# Patient Record
Sex: MALE | Race: WHITE | Employment: OTHER | URBAN - METROPOLITAN AREA
[De-identification: names, ages, dates, MRNs, and addresses within clinical notes are randomized per-mention and may not be internally consistent; named-entity substitution may affect disease eponyms.]

---

## 2021-09-01 ENCOUNTER — APPOINTMENT (OUTPATIENT)
Dept: GENERAL RADIOLOGY | Facility: CLINIC | Age: 81
End: 2021-09-01
Attending: EMERGENCY MEDICINE
Payer: MEDICARE

## 2021-09-01 ENCOUNTER — HOSPITAL ENCOUNTER (EMERGENCY)
Facility: CLINIC | Age: 81
Discharge: HOME | End: 2021-09-01
Attending: EMERGENCY MEDICINE | Admitting: EMERGENCY MEDICINE
Payer: MEDICARE

## 2021-09-01 VITALS
RESPIRATION RATE: 18 BRPM | SYSTOLIC BLOOD PRESSURE: 100 MMHG | OXYGEN SATURATION: 99 % | WEIGHT: 180 LBS | DIASTOLIC BLOOD PRESSURE: 62 MMHG | BODY MASS INDEX: 25.77 KG/M2 | HEART RATE: 69 BPM | HEIGHT: 70 IN | TEMPERATURE: 97.5 F

## 2021-09-01 DIAGNOSIS — N18.9 CHRONIC KIDNEY DISEASE, UNSPECIFIED CKD STAGE: ICD-10-CM

## 2021-09-01 DIAGNOSIS — J44.1 COPD EXACERBATION (H): ICD-10-CM

## 2021-09-01 LAB
ALBUMIN SERPL-MCNC: 3.5 G/DL (ref 3.4–5)
ALP SERPL-CCNC: 81 U/L (ref 40–150)
ALT SERPL W P-5'-P-CCNC: 23 U/L (ref 0–70)
ANION GAP SERPL CALCULATED.3IONS-SCNC: 3 MMOL/L (ref 3–14)
AST SERPL W P-5'-P-CCNC: 13 U/L (ref 0–45)
BASOPHILS # BLD AUTO: 0 10E3/UL (ref 0–0.2)
BASOPHILS NFR BLD AUTO: 1 %
BILIRUB SERPL-MCNC: 0.3 MG/DL (ref 0.2–1.3)
BUN SERPL-MCNC: 28 MG/DL (ref 7–30)
CALCIUM SERPL-MCNC: 8.5 MG/DL (ref 8.5–10.1)
CHLORIDE BLD-SCNC: 107 MMOL/L (ref 94–109)
CO2 SERPL-SCNC: 31 MMOL/L (ref 20–32)
CREAT SERPL-MCNC: 2.25 MG/DL (ref 0.66–1.25)
EOSINOPHIL # BLD AUTO: 0 10E3/UL (ref 0–0.7)
EOSINOPHIL NFR BLD AUTO: 0 %
ERYTHROCYTE [DISTWIDTH] IN BLOOD BY AUTOMATED COUNT: 13 % (ref 10–15)
GFR SERPL CREATININE-BSD FRML MDRD: 27 ML/MIN/1.73M2
GLUCOSE BLD-MCNC: 108 MG/DL (ref 70–99)
HCT VFR BLD AUTO: 36.1 % (ref 40–53)
HGB BLD-MCNC: 11.8 G/DL (ref 13.3–17.7)
IMM GRANULOCYTES # BLD: 0.1 10E3/UL
IMM GRANULOCYTES NFR BLD: 1 %
LYMPHOCYTES # BLD AUTO: 1.4 10E3/UL (ref 0.8–5.3)
LYMPHOCYTES NFR BLD AUTO: 18 %
MCH RBC QN AUTO: 32.6 PG (ref 26.5–33)
MCHC RBC AUTO-ENTMCNC: 32.7 G/DL (ref 31.5–36.5)
MCV RBC AUTO: 100 FL (ref 78–100)
MONOCYTES # BLD AUTO: 0.7 10E3/UL (ref 0–1.3)
MONOCYTES NFR BLD AUTO: 9 %
NEUTROPHILS # BLD AUTO: 5.6 10E3/UL (ref 1.6–8.3)
NEUTROPHILS NFR BLD AUTO: 71 %
NRBC # BLD AUTO: 0 10E3/UL
NRBC BLD AUTO-RTO: 0 /100
NT-PROBNP SERPL-MCNC: 171 PG/ML (ref 0–1800)
PLATELET # BLD AUTO: 200 10E3/UL (ref 150–450)
POTASSIUM BLD-SCNC: 4.7 MMOL/L (ref 3.4–5.3)
PROT SERPL-MCNC: 7.3 G/DL (ref 6.8–8.8)
RBC # BLD AUTO: 3.62 10E6/UL (ref 4.4–5.9)
SARS-COV-2 RNA RESP QL NAA+PROBE: NEGATIVE
SODIUM SERPL-SCNC: 141 MMOL/L (ref 133–144)
TROPONIN I SERPL-MCNC: <0.015 UG/L (ref 0–0.04)
WBC # BLD AUTO: 7.8 10E3/UL (ref 4–11)

## 2021-09-01 PROCEDURE — 99285 EMERGENCY DEPT VISIT HI MDM: CPT | Mod: 25 | Performed by: EMERGENCY MEDICINE

## 2021-09-01 PROCEDURE — 87635 SARS-COV-2 COVID-19 AMP PRB: CPT | Performed by: EMERGENCY MEDICINE

## 2021-09-01 PROCEDURE — 93005 ELECTROCARDIOGRAM TRACING: CPT | Performed by: EMERGENCY MEDICINE

## 2021-09-01 PROCEDURE — 250N000012 HC RX MED GY IP 250 OP 636 PS 637: Mod: GY | Performed by: EMERGENCY MEDICINE

## 2021-09-01 PROCEDURE — 36415 COLL VENOUS BLD VENIPUNCTURE: CPT | Performed by: EMERGENCY MEDICINE

## 2021-09-01 PROCEDURE — 71046 X-RAY EXAM CHEST 2 VIEWS: CPT

## 2021-09-01 PROCEDURE — 82040 ASSAY OF SERUM ALBUMIN: CPT | Performed by: EMERGENCY MEDICINE

## 2021-09-01 PROCEDURE — 85004 AUTOMATED DIFF WBC COUNT: CPT | Performed by: EMERGENCY MEDICINE

## 2021-09-01 PROCEDURE — 83880 ASSAY OF NATRIURETIC PEPTIDE: CPT | Performed by: EMERGENCY MEDICINE

## 2021-09-01 PROCEDURE — C9803 HOPD COVID-19 SPEC COLLECT: HCPCS | Performed by: EMERGENCY MEDICINE

## 2021-09-01 PROCEDURE — 84484 ASSAY OF TROPONIN QUANT: CPT | Performed by: EMERGENCY MEDICINE

## 2021-09-01 PROCEDURE — 93010 ELECTROCARDIOGRAM REPORT: CPT | Performed by: EMERGENCY MEDICINE

## 2021-09-01 RX ORDER — PREDNISONE 20 MG/1
60 TABLET ORAL ONCE
Status: COMPLETED | OUTPATIENT
Start: 2021-09-01 | End: 2021-09-01

## 2021-09-01 RX ORDER — PREDNISONE 10 MG/1
TABLET ORAL
Qty: 32 TABLET | Refills: 0 | Status: SHIPPED | OUTPATIENT
Start: 2021-09-01 | End: 2021-09-11

## 2021-09-01 RX ORDER — DOXYCYCLINE 100 MG/1
100 CAPSULE ORAL 2 TIMES DAILY
Qty: 20 CAPSULE | Refills: 0 | Status: SHIPPED | OUTPATIENT
Start: 2021-09-01 | End: 2021-09-11

## 2021-09-01 RX ADMIN — PREDNISONE 60 MG: 20 TABLET ORAL at 18:04

## 2021-09-01 ASSESSMENT — MIFFLIN-ST. JEOR: SCORE: 1532.72

## 2021-09-01 NOTE — ED PROVIDER NOTES
"  History     Chief Complaint   Patient presents with     Shortness of Breath     hx of COPD, feeling weak, worsening since June HPI  Dmitriy Martin is a 81 year old male who presents complaining of shortness of air for the past several months, worse over the past couple days.  Describes cough productive of some greenish sputum at times.  Describes minimal chest discomfort with cough.  Denies hemoptysis.  No associated fever.  Describes severe shortness of air with exertion.  Denies leg swelling or history of DVT.  Describes diffuse myalgia that is chronic.  He is ex-smoker.  Denies alcohol.  Using inhalers at home with transient relief.    Allergies:  No Known Allergies    Problem List:    There are no problems to display for this patient.       Past Medical History:    No past medical history on file.    Past Surgical History:    No past surgical history on file.    Family History:    No family history on file.    Social History:  Marital Status:   [5]  Social History     Tobacco Use     Smoking status: Never Smoker   Substance Use Topics     Alcohol use: No     Drug use: No        Medications:    doxycycline hyclate (VIBRAMYCIN) 100 MG capsule  predniSONE (DELTASONE) 10 MG tablet  ipratropium - albuterol 0.5 mg/2.5 mg/3 mL (DUONEB) 0.5-2.5 (3) MG/3ML nebulization  Ipratropium-Albuterol (COMBIVENT RESPIMAT)  MCG/ACT inhaler  predniSONE (DELTASONE) 20 MG tablet          Review of Systems  All other systems reviewed and are negative.    Physical Exam   BP: 99/58  Pulse: 69  Temp: 97.5  F (36.4  C)  Resp: 18  Height: 177.8 cm (5' 10\")  Weight: 81.6 kg (180 lb) (stated)  SpO2: 96 %      Physical Exam  Nontoxic appearing mild respiratory distress in form of tachypnea, increased expiratory phase, audible expiratory wheeze  Head atraumatic normocephalic  Neck supple full active painless range of motion  Lungs very poor air movement  Heart regular no murmur  Abdomen soft nontender bowel sounds positive "   Strength and sensation grossly intact throughout the extremities, gait and station normal  Speech is fluent, good eye contact, thought processes are rational  Lower extremities without swelling, redness or tenderness  Pedal pulses symmetrical and strong    ED Course        Procedures  EKG normal sinus rhythm rate 62, axes intervals normal, low voltage consistent with pulmonary disease, no acute ST-T wave changes, read by Dr. Víctor Rico            Critical Care time:  none         Results for orders placed or performed during the hospital encounter of 09/01/21   Chest XR,  PA & LAT     Status: None    Narrative    CHEST TWO VIEWS 9/1/2021 5:11 PM     HISTORY: copd    COMPARISON: 10/11/2016       Impression    IMPRESSION: No pleural fluid or pneumothorax. No airspace disease or  edema. Normal size of the heart. There is aortic calcification.    LESTER J FAHRNER, MD         SYSTEM ID:  [id number]   CBC with platelets, differential     Status: Abnormal    Narrative    The following orders were created for panel order CBC with platelets, differential.  Procedure                               Abnormality         Status                     ---------                               -----------         ------                     CBC with platelets and d...[[id number]]  Abnormal            Final result                 Please view results for these tests on the individual orders.   Comprehensive metabolic panel     Status: Abnormal   Result Value Ref Range    Sodium 141 133 - 144 mmol/L    Potassium 4.7 3.4 - 5.3 mmol/L    Chloride 107 94 - 109 mmol/L    Carbon Dioxide (CO2) 31 20 - 32 mmol/L    Anion Gap 3 3 - 14 mmol/L    Urea Nitrogen 28 7 - 30 mg/dL    Creatinine 2.25 (H) 0.66 - 1.25 mg/dL    Calcium 8.5 8.5 - 10.1 mg/dL    Glucose 108 (H) 70 - 99 mg/dL    Alkaline Phosphatase 81 40 - 150 U/L    AST 13 0 - 45 U/L    ALT 23 0 - 70 U/L    Protein Total 7.3 6.8 - 8.8 g/dL    Albumin 3.5 3.4 - 5.0 g/dL    Bilirubin Total 0.3  0.2 - 1.3 mg/dL    GFR Estimate 27 (L) >60 mL/min/1.73m2   CBC with platelets and differential     Status: Abnormal   Result Value Ref Range    WBC Count 7.8 4.0 - 11.0 10e3/uL    RBC Count 3.62 (L) 4.40 - 5.90 10e6/uL    Hemoglobin 11.8 (L) 13.3 - 17.7 g/dL    Hematocrit 36.1 (L) 40.0 - 53.0 %     78 - 100 fL    MCH 32.6 26.5 - 33.0 pg    MCHC 32.7 31.5 - 36.5 g/dL    RDW 13.0 10.0 - 15.0 %    Platelet Count 200 150 - 450 10e3/uL    % Neutrophils 71 %    % Lymphocytes 18 %    % Monocytes 9 %    % Eosinophils 0 %    % Basophils 1 %    % Immature Granulocytes 1 %    NRBCs per 100 WBC 0 <1 /100    Absolute Neutrophils 5.6 1.6 - 8.3 10e3/uL    Absolute Lymphocytes 1.4 0.8 - 5.3 10e3/uL    Absolute Monocytes 0.7 0.0 - 1.3 10e3/uL    Absolute Eosinophils 0.0 0.0 - 0.7 10e3/uL    Absolute Basophils 0.0 0.0 - 0.2 10e3/uL    Absolute Immature Granulocytes 0.1 (H) <=0.0 10e3/uL    Absolute NRBCs 0.0 10e3/uL   Symptomatic COVID-19 Virus (Coronavirus) by PCR Nasopharyngeal     Status: Normal    Specimen: Nasopharyngeal; Swab   Result Value Ref Range    SARS CoV2 PCR Negative Negative    Narrative    Testing was performed using the [place]  SARS-CoV-2 & Influenza A/B Assay on the [place]  [employer]  System.  This test should be ordered for the detection of SARS-COV-2 in individuals who meet SARS-CoV-2 clinical and/or epidemiological criteria. Test performance is unknown in asymptomatic patients.  This test is for in vitro diagnostic use under the FDA EUA for laboratories certified under CLIA to perform moderate and/or high complexity testing. This test has not been FDA cleared or approved.  A negative test does not rule out the presence of PCR inhibitors in the specimen or target RNA in concentration below the limit of detection for the assay. The possibility of a false negative should be considered if the patient's recent exposure or clinical presentation suggests COVID-19.  [facility] are certified under the  Clinical Laboratory Improvement Amendments of 1988 (CLIA-88) as qualified to perform moderate and/or high complexity laboratory testing.   Troponin I     Status: Normal   Result Value Ref Range    Troponin I <0.015 0.000 - 0.045 ug/L   NT pro BNP     Status: Normal   Result Value Ref Range    N terminal Pro BNP Inpatient 171 0-1,800 pg/mL              No results found for this or any previous visit (from the past 24 hour(s)).    Medications   predniSONE (DELTASONE) tablet 60 mg (60 mg Oral Given 9/1/21 1804)       Assessments & Plan (with Medical Decision Making)  81-year-old male with COPD presents with wheezing and shortness of air.  Chest x-ray by my review as well as radiology read is negative for acute finding.  ECG no ischemic change.  Labs within normal with exception of creatinine of 2.25, no prior for comparison.  Continues to make urine.  No current meds that could affect renal function.  Recommend follow-up primary care for recheck BUN/creatinine in the next 7 to 10 days.  Treat with prednisone, doxycycline and continue inhalers with respect to COPD exacerbation.  No evidence for congestive heart failure or pneumonia.  Discussed return criteria patient expressed understanding and agreement.     I have reviewed the nursing notes.    I have reviewed the findings, diagnosis, plan and need for follow up with the patient.       Discharge Medication List as of 9/1/2021  7:26 PM      START taking these medications    Details   doxycycline hyclate (VIBRAMYCIN) 100 MG capsule Take 1 capsule (100 mg) by mouth 2 times daily for 10 days, Disp-20 capsule, R-0, E-Prescribe      !! predniSONE (DELTASONE) 10 MG tablet Take 4 tablets daily for 5 days,  take 2 tablets daily for 3 days, take 1 tablet daily for 3 days, take half a tablet for 3 days., Disp-32 tablet, R-0, E-Prescribe       !! - Potential duplicate medications found. Please discuss with provider.          Final diagnoses:   COPD exacerbation (H)   Chronic  kidney disease, unspecified CKD stage       9/1/2021   [facility] EMERGENCY DEPT     Víctor Rico MD  09/03/21 0613

## 2021-09-01 NOTE — ED NOTES
"SOB and DORADO  Joints are stiff, keep locking up  Light-headedness with standing  No n/v  Appetite is normal  No chest pain  \"every muscle in my body hurts\"  Hurts with lifting, walking, bending  "

## 2021-09-02 NOTE — DISCHARGE INSTRUCTIONS
Continue inhalers and nebulizer treatments    Prednisone as prescribed    Doxycycline as prescribed    Follow-up primary care in about 10 days, return here for worsening symptoms.

## 2021-09-29 ENCOUNTER — APPOINTMENT (OUTPATIENT)
Dept: CT IMAGING | Facility: CLINIC | Age: 81
End: 2021-09-29
Attending: FAMILY MEDICINE
Payer: MEDICARE

## 2021-09-29 ENCOUNTER — HOSPITAL ENCOUNTER (EMERGENCY)
Facility: CLINIC | Age: 81
Discharge: HOME | End: 2021-09-29
Attending: FAMILY MEDICINE | Admitting: FAMILY MEDICINE
Payer: MEDICARE

## 2021-09-29 VITALS
SYSTOLIC BLOOD PRESSURE: 129 MMHG | DIASTOLIC BLOOD PRESSURE: 60 MMHG | RESPIRATION RATE: 10 BRPM | TEMPERATURE: 96.9 F | BODY MASS INDEX: 25.83 KG/M2 | WEIGHT: 180 LBS | OXYGEN SATURATION: 100 % | HEART RATE: 72 BPM

## 2021-09-29 DIAGNOSIS — Q45.3 PANCREATIC ABNORMALITY: ICD-10-CM

## 2021-09-29 DIAGNOSIS — M54.2 CHRONIC NECK PAIN: ICD-10-CM

## 2021-09-29 DIAGNOSIS — G89.29 CHRONIC NECK PAIN: ICD-10-CM

## 2021-09-29 DIAGNOSIS — R79.89 ELEVATED D-DIMER: ICD-10-CM

## 2021-09-29 LAB
ALBUMIN SERPL-MCNC: 2.9 G/DL (ref 3.4–5)
ALP SERPL-CCNC: 80 U/L (ref 40–150)
ALT SERPL W P-5'-P-CCNC: 14 U/L (ref 0–70)
ANION GAP SERPL CALCULATED.3IONS-SCNC: 3 MMOL/L (ref 3–14)
AST SERPL W P-5'-P-CCNC: 14 U/L (ref 0–45)
BASOPHILS # BLD AUTO: 0 10E3/UL (ref 0–0.2)
BASOPHILS NFR BLD AUTO: 0 %
BILIRUB SERPL-MCNC: 0.4 MG/DL (ref 0.2–1.3)
BUN SERPL-MCNC: 32 MG/DL (ref 7–30)
CALCIUM SERPL-MCNC: 8.8 MG/DL (ref 8.5–10.1)
CHLORIDE BLD-SCNC: 106 MMOL/L (ref 94–109)
CO2 SERPL-SCNC: 29 MMOL/L (ref 20–32)
CREAT SERPL-MCNC: 2.07 MG/DL (ref 0.66–1.25)
D DIMER PPP FEU-MCNC: 2.27 UG/ML FEU (ref 0–0.5)
EOSINOPHIL # BLD AUTO: 0 10E3/UL (ref 0–0.7)
EOSINOPHIL NFR BLD AUTO: 0 %
ERYTHROCYTE [DISTWIDTH] IN BLOOD BY AUTOMATED COUNT: 13 % (ref 10–15)
GFR SERPL CREATININE-BSD FRML MDRD: 29 ML/MIN/1.73M2
GLUCOSE BLD-MCNC: 170 MG/DL (ref 70–99)
HCT VFR BLD AUTO: 34.8 % (ref 40–53)
HGB BLD-MCNC: 11.2 G/DL (ref 13.3–17.7)
HOLD SPECIMEN: NORMAL
IMM GRANULOCYTES # BLD: 0 10E3/UL
IMM GRANULOCYTES NFR BLD: 0 %
LIPASE SERPL-CCNC: 426 U/L (ref 73–393)
LYMPHOCYTES # BLD AUTO: 1.4 10E3/UL (ref 0.8–5.3)
LYMPHOCYTES NFR BLD AUTO: 14 %
MCH RBC QN AUTO: 32.5 PG (ref 26.5–33)
MCHC RBC AUTO-ENTMCNC: 32.2 G/DL (ref 31.5–36.5)
MCV RBC AUTO: 101 FL (ref 78–100)
MONOCYTES # BLD AUTO: 0.9 10E3/UL (ref 0–1.3)
MONOCYTES NFR BLD AUTO: 9 %
NEUTROPHILS # BLD AUTO: 7.8 10E3/UL (ref 1.6–8.3)
NEUTROPHILS NFR BLD AUTO: 77 %
NRBC # BLD AUTO: 0 10E3/UL
NRBC BLD AUTO-RTO: 0 /100
NT-PROBNP SERPL-MCNC: 291 PG/ML (ref 0–1800)
PLATELET # BLD AUTO: 195 10E3/UL (ref 150–450)
POTASSIUM BLD-SCNC: 4 MMOL/L (ref 3.4–5.3)
PROT SERPL-MCNC: 7 G/DL (ref 6.8–8.8)
RBC # BLD AUTO: 3.45 10E6/UL (ref 4.4–5.9)
SODIUM SERPL-SCNC: 138 MMOL/L (ref 133–144)
TROPONIN I SERPL-MCNC: <0.015 UG/L (ref 0–0.04)
WBC # BLD AUTO: 10.2 10E3/UL (ref 4–11)

## 2021-09-29 PROCEDURE — 96360 HYDRATION IV INFUSION INIT: CPT | Mod: 59 | Performed by: FAMILY MEDICINE

## 2021-09-29 PROCEDURE — 93005 ELECTROCARDIOGRAM TRACING: CPT | Performed by: FAMILY MEDICINE

## 2021-09-29 PROCEDURE — 93971 EXTREMITY STUDY: CPT | Performed by: FAMILY MEDICINE

## 2021-09-29 PROCEDURE — 85025 COMPLETE CBC W/AUTO DIFF WBC: CPT | Performed by: FAMILY MEDICINE

## 2021-09-29 PROCEDURE — 93971 EXTREMITY STUDY: CPT | Mod: 26 | Performed by: FAMILY MEDICINE

## 2021-09-29 PROCEDURE — 93308 TTE F-UP OR LMTD: CPT | Mod: 26 | Performed by: FAMILY MEDICINE

## 2021-09-29 PROCEDURE — 80053 COMPREHEN METABOLIC PANEL: CPT | Performed by: FAMILY MEDICINE

## 2021-09-29 PROCEDURE — 99285 EMERGENCY DEPT VISIT HI MDM: CPT | Mod: 25 | Performed by: FAMILY MEDICINE

## 2021-09-29 PROCEDURE — 85379 FIBRIN DEGRADATION QUANT: CPT | Performed by: FAMILY MEDICINE

## 2021-09-29 PROCEDURE — 71250 CT THORAX DX C-: CPT

## 2021-09-29 PROCEDURE — 83880 ASSAY OF NATRIURETIC PEPTIDE: CPT | Performed by: FAMILY MEDICINE

## 2021-09-29 PROCEDURE — 93010 ELECTROCARDIOGRAM REPORT: CPT | Performed by: FAMILY MEDICINE

## 2021-09-29 PROCEDURE — 93308 TTE F-UP OR LMTD: CPT | Performed by: FAMILY MEDICINE

## 2021-09-29 PROCEDURE — 83690 ASSAY OF LIPASE: CPT | Performed by: FAMILY MEDICINE

## 2021-09-29 PROCEDURE — 258N000003 HC RX IP 258 OP 636: Performed by: FAMILY MEDICINE

## 2021-09-29 PROCEDURE — 84484 ASSAY OF TROPONIN QUANT: CPT | Performed by: FAMILY MEDICINE

## 2021-09-29 PROCEDURE — 36415 COLL VENOUS BLD VENIPUNCTURE: CPT | Performed by: FAMILY MEDICINE

## 2021-09-29 PROCEDURE — 96361 HYDRATE IV INFUSION ADD-ON: CPT | Performed by: FAMILY MEDICINE

## 2021-09-29 RX ORDER — IOPAMIDOL 755 MG/ML
76 INJECTION, SOLUTION INTRAVASCULAR ONCE
Status: DISCONTINUED | OUTPATIENT
Start: 2021-09-29 | End: 2021-09-30 | Stop reason: HOSPADM

## 2021-09-29 RX ADMIN — SODIUM CHLORIDE 1000 ML: 9 INJECTION, SOLUTION INTRAVENOUS at 18:45

## 2021-09-29 NOTE — ED TRIAGE NOTES
Chest pain that radiates to left arm with shortness of breath with activity that started last night.  Patient is on medication for hypertension.  Patient denies cough.

## 2021-09-29 NOTE — ED PROVIDER NOTES
"  HPI   The patient is an 81-year-old male presenting with chest pain and shortness of breath.  He has a known history of COPD.  He was seen in the beginning of September and given prednisone.  He tells me that his COPD symptoms improved greatly while on the prednisone.  No other medication changes reported.  He denies alcohol use.  No drugs of abuse.  He continues to smoke occasionally \"when I am depressed.\"    Patient describes new onset of left shoulder and left upper chest pain starting yesterday afternoon.  He was bringing the garbage out to the end of the driveway when his symptoms began.  His symptoms have been waxing and waning since onset.  He describes mild chest discomfort currently.  He describes shortness of breath that is worse than he experienced at the beginning of September.  He denies any wheezing.  He does tell me that taking his albuterol seems to help the breathing, however.  He denies new leg pain or swelling.  No cough or congestion.  No fever.  No trauma or injury.  No palpitations.  No lightheadedness or fainting.        Allergies:  No Known Allergies  Problem List:    There are no problems to display for this patient.     Past Medical History:    No past medical history on file.  Past Surgical History:    No past surgical history on file.  Family History:    No family history on file.  Social History:  Marital Status:   [5]  Social History     Tobacco Use     Smoking status: Never Smoker   Substance Use Topics     Alcohol use: No     Drug use: No      Medications:    ipratropium - albuterol 0.5 mg/2.5 mg/3 mL (DUONEB) 0.5-2.5 (3) MG/3ML nebulization  Ipratropium-Albuterol (COMBIVENT RESPIMAT)  MCG/ACT inhaler  predniSONE (DELTASONE) 20 MG tablet      Review of Systems   All other systems reviewed and are negative.      PE   BP: 104/62  Pulse: 56  Temp: 96.9  F (36.1  C)  Resp: 18  Weight: 81.6 kg (180 lb)  SpO2: 100 %  Physical Exam  Vitals and nursing note reviewed. "   Constitutional:       General: He is not in acute distress.  HENT:      Head: Atraumatic.      Right Ear: External ear normal.      Left Ear: External ear normal.      Nose: Nose normal.      Mouth/Throat:      Mouth: Mucous membranes are moist.      Pharynx: Oropharynx is clear.   Eyes:      General: No scleral icterus.     Extraocular Movements: Extraocular movements intact.      Conjunctiva/sclera: Conjunctivae normal.      Pupils: Pupils are equal, round, and reactive to light.   Cardiovascular:      Rate and Rhythm: Normal rate.   Pulmonary:      Effort: Pulmonary effort is normal. No respiratory distress.      Breath sounds: Normal breath sounds.   Chest:      Chest wall: No tenderness.   Abdominal:      Palpations: Abdomen is soft.      Tenderness: There is no abdominal tenderness.   Musculoskeletal:         General: Normal range of motion.      Cervical back: Normal range of motion.      Right lower leg: No tenderness. No edema.      Left lower leg: No tenderness. No edema.   Skin:     General: Skin is warm and dry.   Neurological:      Mental Status: He is alert and oriented to person, place, and time.   Psychiatric:         Behavior: Behavior normal.         ED COURSE and [facility]   1636.  The patient presents with chest pain and shortness of breath.  Symptoms began yesterday afternoon.  He describes having associated diaphoresis as well.  No nausea or vomiting.  EKG is unremarkable and documented below.  Lab values pending.  Bedside ultrasound will be performed and documented below as well.    1842.  The patient has had a normal O2 saturation throughout.  He does not have tachycardia.  His blood pressure has been less than 140 systolic.  He continues to have left-sided chest pain and some dyspnea.  He has an elevated D-dimer.  I did bedside point-of-care ultrasound to look for DVT in the lower extremities, both legs negative.  CT scan ordered to look for pulmonary embolism.  GFR is reviewed and I did discuss  the risks and benefits of performing this test with the patient.  He understands that there is theoretical risk for kidney failure following high contrast load and low GFR.  I believe the patient has low risk given the circumstances.  I will provide fluid bolus of normal saline 1000 mL.  Low concern for acute coronary obstruction/MI.  Low concern for heart failure.  His COPD has not presented with chest pain previously.  Pt will be signed out to Dr. Storey at shift change.    EKG  (6705)   Interpretation performed by me.  Rate: 100     Rhythm: sinus     Axis: nl  Intervals: IN (12-2) 148, QRS (<12) 75, QTc (>5) 376  P wave: nl     QRS complex: nl  ST segment / T-wave: nl  Conclusion: sinus tachycardia with occasional PAC.    LABS  Labs Ordered and Resulted from Time of ED Arrival Up to the Time of Departure from the ED   COMPREHENSIVE METABOLIC PANEL - Abnormal; Notable for the following components:       Result Value    Urea Nitrogen 32 (*)     Creatinine 2.07 (*)     Glucose 170 (*)     Albumin 2.9 (*)     GFR Estimate 29 (*)     All other components within normal limits   D DIMER QUANTITATIVE - Abnormal; Notable for the following components:    D-Dimer Quantitative 2.27 (*)     All other components within normal limits    Narrative:     This D-dimer assay is intended for use in conjunction with a clinical pretest probability assessment model to exclude pulmonary embolism (PE) and deep venous thrombosis (DVT) in outpatients suspected of PE or DVT. The cut-off value is 0.50 ug/mL FEU.   CBC WITH PLATELETS AND DIFFERENTIAL - Abnormal; Notable for the following components:    RBC Count 3.45 (*)     Hemoglobin 11.2 (*)     Hematocrit 34.8 (*)      (*)     All other components within normal limits   LIPASE - Abnormal; Notable for the following components:    Lipase 426 (*)     All other components within normal limits   TROPONIN I - Normal   NT PROBNP INPATIENT - Normal   EXTRA BLUE TOP TUBE   EXTRA RED TOP TUBE    EXTRA GREEN TOP (LITHIUM HEPARIN) TUBE   EXTRA GREEN TOP (LITHIUM HEPARIN) TUBE   EXTRA PURPLE TOP TUBE   EXTRA RED TOP TUBE   EXTRA TUBE    Narrative:     The following orders were created for panel order [place] Draw.  Procedure                               Abnormality         Status                     ---------                               -----------         ------                     Extra Blue Top Tube[[id number]]                              Final result               Extra Red Top Tube[[id number]]                               Final result               Extra Green Top (Lithium...[[id number]]                      Final result               Extra Green Top (Lithium...[[id number]]                      Final result               Extra Purple Top Tube[[id number]]                            Final result                 Please view results for these tests on the individual orders.   CBC WITH PLATELETS & DIFFERENTIAL    Narrative:     The following orders were created for panel order CBC with platelets, differential.  Procedure                               Abnormality         Status                     ---------                               -----------         ------                     CBC with platelets and d...[[id number]]  Abnormal            Final result                 Please view results for these tests on the individual orders.   EXTRA TUBE    Narrative:     The following orders were created for panel order Extra Tube.  Procedure                               Abnormality         Status                     ---------                               -----------         ------                     Extra Red Top Tube[[id number]]                               Final result                 Please view results for these tests on the individual orders.       IMAGING  Images reviewed by me.  Radiology report also reviewed.  Chest CT w/o contrast   Final Result   IMPRESSION:    1.  Inflammatory changes centered on the  pancreas, suspicious for acute pancreatitis. Correlate with serum lipase.   2.  Trace bilateral pleural effusions.   3.  Advanced centrilobular emphysema.   4.  Few pulmonary nodules measuring up to 6 mm. See follow-up guidelines.      REFERENCE:   Guidelines for Management of Incidental Pulmonary Nodules Detected on CT Images: From the Fleischner Society 2017.    Guidelines apply to incidental nodules in patients who are 35 years or older.   Guidelines do not apply to lung cancer screening, patients with immunosuppression, or patients with known primary cancer.      MULTIPLE NODULES      Nodule size 6 mm or larger   Low-risk patients: Follow-up CT at 3-6 months, then consider CT at 18-24 months.   High-risk patients: Follow-up CT at 3-6 months, then at 18-24 months if no change.   -Use most suspicious nodule as guide to management.      Consider referral to lung nodule clinic.            POC US FOR DVT UNILATERAL LIMITED   Final Result   [facility] Procedure Note        Limited Bedside ED Ultrasound for DVT:      PERFORMED BY: Dr. Kody Hartley MD   INDICATIONS:  Concern for PE and desire to avoid CT PE.   PROBE: High frequency linear probe   BODY LOCATION:  Both legs   FINDINGS:     Right:       Common femoral vein:  Compressible     Thrombus:  Absent    Superficial femoral vein:  Compressible     Thrombus:  Absent    Popliteal vein: Compressible     Thrombus:  Absent      Left:       Common femoral vein:  Compressible     Thrombus:  Absent    Superficial femoral vein:  Compressible     Thrombus:  Absent    Popliteal vein: Compressible     Thrombus:  Absent      INTERPRETATION:  The common femoral, superficial femoral and popliteal veins were identified and differentiated from the corresponding arteries.  Compression of the vein demonstrated no DVT and no thrombus was visualized in the veins.     IMAGE DOCUMENTATION: Images were archived to hard drive.         POC US ECHO LIMITED   Final Result    [facility] Procedure Note        Limited Bedside ED Cardiac Ultrasound:      PROCEDURE: PERFORMED BY: Dr. Kody Hartley MD   INDICATIONS/SYMPTOM:  Chest Pain   PROBE: Cardiac phased array probe   BODY LOCATION: Chest   FINDINGS:    The ultrasound was performed utilizing the subcostal and apical 4 chamber views.   Cardiac contractility:  Present   Gross estimation of cardiac kinesis: normal   Pericardial Effusion:  None   RV:LV ratio: Equal   INTERPRETATION:    Overall, this ultrasound was difficult.  Windows were hard to come by.  Chamber size estimated with left ventricle equal to right ventricle.  Normal cardiac kinesis with extrasystole.  No evidence for pericardial effusion.     IMAGE DOCUMENTATION: Images were archived to hard drive.                  Procedures    Medications   0.9% sodium chloride BOLUS (0 mLs Intravenous Stopped 9/29/21 2118)         IMPRESSION       ICD-10-CM    1. Chronic neck pain  M54.2     G89.29    2. Elevated d-dimer  R79.89    3. Pancreatic abnormality  Q45.3             Medication List      There are no discharge medications for this visit.                       Kody Hartley MD  09/30/21 0918

## 2021-09-30 NOTE — ED NOTES
Called CT department for clarification why PE study had not been completed. CT states radiologist had talked with provider and ordered separate scan due to GFR level. Patient continues to rest in room at this time and appears in no distress.

## 2021-09-30 NOTE — ED PROVIDER NOTES
"81-year-old signed out to me at shift change awaiting CT PE study.  I received a call from Dr. Crystal in radiology who declined to perform CT PE study because the patient had a GFR of 26.  He believes this could worsen his renal function.  Recommend a ventilation perfusion test.    910PM: Patient reassessed.  He does not want to wait for the VQ scan.  Would like to schedule it as an outpatient.  I discussed with him that it will be 2 more hours before the technician can be here.  I discussed with him that if he has a PE it should be diagnosed and treated as soon as possible and generally would not recommend outpatient work-up.  Patient tells me he has pain in his neck radiating to his chest and that he feels that the chest pain is \"on the surface.\"  He has oxygen dependent COPD and uses home O2 at 3 L/min.  His oxygen needs have not changed.  He has not had a fever and he has not had tachycardia.    When I examined him I can palpate his chest wall and reproduce the pain.  He is tender over the left anterior chest without overlying ecchymosis, erythema, subcutaneous emphysema or crepitus.    He is not willing to wait for VQ scan.  I suggested to him that we do a noncontrast chest CT at least to rule out things like pneumonia or a mass lesion or pneumothorax.  If these are negative any still does not want to have a ventilation/perfusion scan, he can follow-up in primary care.  He does have a reasonable explanation for his chest pain other than PE and despite the elevated D-dimer.  He understands however he is taking a risk that he will have an undiagnosed PE and could end up with chronic thromboembolic pulmonary hypertension and even death.    11 PM: Chest CT results reviewed.  There is some fat stranding around the pancreas.  This is questionable for pancreatitis.  Lipase is mildly elevated at 426.  The patient consumes no alcohol and has had his gallbladder removed in the distant past.  I am skeptical that the " "patient has pancreatitis.  He seems very clear that the symptoms he has now are an exacerbation of the chronic neck and chest wall pain he has had for years.  He came in only because his neck \"locked up on him.    His elevated D-dimer is nonspecific.  He understands we cannot rule out PE and that this can be a dangerous condition but I am not highly suspicious that his symptoms represent PE given that he is not dyspneic and has normal vital signs with a normal pulse and normal O2 sats.  Regardless, the radiologist are unwilling to suggest him to contrast dye and he is unwilling to wait for V/Q scanning.    Even if he has pancreatitis acutely he is minimally symptomatic and able to take food and fluids and does not require hospitalization for this but I have a very low suspicion that this is a real result.  He will probably need some follow-up imaging but he can pursue that through primary care based on his symptoms.    He would like to be discharged home.  He has medication at home for his neck pain.  He should return to be seen if he develops any new concerning symptoms such as abdominal pain, nausea, vomiting, fevers, dyspnea, rapid or irregular heartbeat, or other worrisome symptoms.     Glen Storey MD  09/29/21 2619    "

## 2021-09-30 NOTE — DISCHARGE INSTRUCTIONS
You should return to the emergency department if you develop shortness of breath, rapid or irregular heartbeat, lightheadedness, or other worrisome symptoms.  Otherwise follow-up with Dr. Klein at the end of this week or next week for recheck.